# Patient Record
Sex: MALE | NOT HISPANIC OR LATINO | URBAN - NONMETROPOLITAN AREA
[De-identification: names, ages, dates, MRNs, and addresses within clinical notes are randomized per-mention and may not be internally consistent; named-entity substitution may affect disease eponyms.]

---

## 2019-02-20 ENCOUNTER — OFFICE VISIT CONVERTED (OUTPATIENT)
Dept: FAMILY MEDICINE CLINIC | Age: 29
End: 2019-02-20
Attending: FAMILY MEDICINE

## 2019-02-20 ENCOUNTER — HOSPITAL ENCOUNTER (OUTPATIENT)
Dept: OTHER | Facility: HOSPITAL | Age: 29
Discharge: HOME | End: 2019-02-20
Attending: FAMILY MEDICINE

## 2019-02-21 LAB
ALBUMIN SERPL-MCNC: 4.5 G/DL (ref 3.5–5)
ALBUMIN/GLOB SERPL: 1.8 {RATIO} (ref 1.4–2.6)
ALP SERPL-CCNC: 56 U/L (ref 53–128)
ALT SERPL-CCNC: 31 U/L (ref 10–40)
ANION GAP SERPL CALC-SCNC: 14 MMOL/L (ref 8–19)
AST SERPL-CCNC: 32 U/L (ref 15–50)
BASOPHILS # BLD MANUAL: 0.03 10*3/UL (ref 0–0.2)
BASOPHILS NFR BLD MANUAL: 0.3 % (ref 0–3)
BILIRUB SERPL-MCNC: 0.24 MG/DL (ref 0.2–1.3)
BUN SERPL-MCNC: 11 MG/DL (ref 5–25)
BUN/CREAT SERPL: 10 {RATIO} (ref 6–20)
CALCIUM SERPL-MCNC: 9 MG/DL (ref 8.7–10.4)
CHLORIDE SERPL-SCNC: 106 MMOL/L (ref 99–111)
CHOLEST SERPL-MCNC: 162 MG/DL (ref 107–200)
CHOLEST/HDLC SERPL: 3.8 {RATIO} (ref 3–6)
CONV CO2: 25 MMOL/L (ref 22–32)
CONV TOTAL PROTEIN: 7 G/DL (ref 6.3–8.2)
CREAT UR-MCNC: 1.05 MG/DL (ref 0.7–1.2)
DEPRECATED RDW RBC AUTO: 41.1 FL
EOSINOPHIL # BLD MANUAL: 0.12 10*3/UL (ref 0–0.7)
EOSINOPHIL NFR BLD MANUAL: 1.2 % (ref 0–7)
ERYTHROCYTE [DISTWIDTH] IN BLOOD BY AUTOMATED COUNT: 12.6 % (ref 11.5–14.5)
GFR SERPLBLD BASED ON 1.73 SQ M-ARVRAT: >60 ML/MIN/{1.73_M2}
GLOBULIN UR ELPH-MCNC: 2.5 G/DL (ref 2–3.5)
GLUCOSE SERPL-MCNC: 81 MG/DL (ref 70–99)
GRANS (ABSOLUTE): 6.32 10*3/UL (ref 2–8)
GRANS: 64.6 % (ref 30–85)
HBA1C MFR BLD: 15.2 G/DL (ref 14–18)
HCT VFR BLD AUTO: 44.9 % (ref 42–52)
HDLC SERPL-MCNC: 43 MG/DL (ref 40–60)
IMM GRANULOCYTES # BLD: 0.01 10*3/UL (ref 0–0.54)
IMM GRANULOCYTES NFR BLD: 0.1 % (ref 0–0.43)
LDLC SERPL CALC-MCNC: 102 MG/DL (ref 70–100)
LYMPHOCYTES # BLD MANUAL: 2.74 10*3/UL (ref 1–5)
LYMPHOCYTES NFR BLD MANUAL: 5.8 % (ref 3–10)
MCH RBC QN AUTO: 30 PG (ref 27–31)
MCHC RBC AUTO-ENTMCNC: 33.9 G/DL (ref 33–37)
MCV RBC AUTO: 88.6 FL (ref 80–96)
MONOCYTES # BLD AUTO: 0.57 10*3/UL (ref 0.2–1.2)
OSMOLALITY SERPL CALC.SUM OF ELEC: 290 MOSM/KG (ref 273–304)
PLATELET # BLD AUTO: 200 10*3/UL (ref 130–400)
PMV BLD AUTO: 11.3 FL (ref 7.4–10.4)
POTASSIUM SERPL-SCNC: 3.8 MMOL/L (ref 3.5–5.3)
RBC # BLD AUTO: 5.07 10*6/UL (ref 4.7–6.1)
SODIUM SERPL-SCNC: 141 MMOL/L (ref 135–147)
TRIGL SERPL-MCNC: 86 MG/DL (ref 40–150)
TSH SERPL-ACNC: 1.37 M[IU]/L (ref 0.27–4.2)
VARIANT LYMPHS NFR BLD MANUAL: 28 % (ref 20–45)
VLDLC SERPL-MCNC: 17 MG/DL (ref 5–37)
WBC # BLD AUTO: 9.79 10*3/UL (ref 4.8–10.8)

## 2020-02-27 ENCOUNTER — HOSPITAL ENCOUNTER (OUTPATIENT)
Dept: OTHER | Facility: HOSPITAL | Age: 30
Discharge: HOME | End: 2020-02-27
Attending: NURSE PRACTITIONER

## 2020-02-27 ENCOUNTER — OFFICE VISIT CONVERTED (OUTPATIENT)
Dept: FAMILY MEDICINE CLINIC | Age: 30
End: 2020-02-27
Attending: NURSE PRACTITIONER

## 2020-02-27 LAB
ALBUMIN SERPL-MCNC: 4.9 G/DL (ref 3.5–5)
ALBUMIN/GLOB SERPL: 2.1 {RATIO} (ref 1.4–2.6)
ALP SERPL-CCNC: 64 U/L (ref 53–128)
ALT SERPL-CCNC: 31 U/L (ref 10–40)
ANION GAP SERPL CALC-SCNC: 18 MMOL/L (ref 8–19)
AST SERPL-CCNC: 25 U/L (ref 15–50)
BILIRUB SERPL-MCNC: 0.36 MG/DL (ref 0.2–1.3)
BUN SERPL-MCNC: 9 MG/DL (ref 5–25)
BUN/CREAT SERPL: 10 {RATIO} (ref 6–20)
CALCIUM SERPL-MCNC: 9.1 MG/DL (ref 8.7–10.4)
CHLORIDE SERPL-SCNC: 104 MMOL/L (ref 99–111)
CHOLEST SERPL-MCNC: 158 MG/DL (ref 107–200)
CHOLEST/HDLC SERPL: 4.9 {RATIO} (ref 3–6)
CONV CO2: 23 MMOL/L (ref 22–32)
CONV TOTAL PROTEIN: 7.2 G/DL (ref 6.3–8.2)
CREAT UR-MCNC: 0.93 MG/DL (ref 0.7–1.2)
GFR SERPLBLD BASED ON 1.73 SQ M-ARVRAT: >60 ML/MIN/{1.73_M2}
GLOBULIN UR ELPH-MCNC: 2.3 G/DL (ref 2–3.5)
GLUCOSE SERPL-MCNC: 89 MG/DL (ref 70–99)
HDLC SERPL-MCNC: 32 MG/DL (ref 40–60)
LDLC SERPL CALC-MCNC: 98 MG/DL (ref 70–100)
OSMOLALITY SERPL CALC.SUM OF ELEC: 290 MOSM/KG (ref 273–304)
POTASSIUM SERPL-SCNC: 4.2 MMOL/L (ref 3.5–5.3)
SODIUM SERPL-SCNC: 141 MMOL/L (ref 135–147)
TRIGL SERPL-MCNC: 139 MG/DL (ref 40–150)
TSH SERPL-ACNC: 1.85 M[IU]/L (ref 0.27–4.2)
VLDLC SERPL-MCNC: 28 MG/DL (ref 5–37)

## 2020-06-15 ENCOUNTER — OFFICE VISIT CONVERTED (OUTPATIENT)
Dept: FAMILY MEDICINE CLINIC | Age: 30
End: 2020-06-15
Attending: FAMILY MEDICINE

## 2020-06-15 ENCOUNTER — HOSPITAL ENCOUNTER (OUTPATIENT)
Dept: OTHER | Facility: HOSPITAL | Age: 30
Discharge: HOME | End: 2020-06-15
Attending: FAMILY MEDICINE

## 2020-06-15 LAB
ALBUMIN SERPL-MCNC: 4.5 G/DL (ref 3.5–5)
ALBUMIN/GLOB SERPL: 1.5 {RATIO} (ref 1.4–2.6)
ALP SERPL-CCNC: 79 U/L (ref 53–128)
ALT SERPL-CCNC: 39 U/L (ref 10–40)
ANION GAP SERPL CALC-SCNC: 16 MMOL/L (ref 8–19)
APPEARANCE UR: CLEAR
AST SERPL-CCNC: 32 U/L (ref 15–50)
BACTERIA UR QL AUTO: ABNORMAL
BASOPHILS # BLD MANUAL: 0.02 10*3/UL (ref 0–0.2)
BASOPHILS NFR BLD MANUAL: 0.3 % (ref 0–3)
BILIRUB SERPL-MCNC: 0.77 MG/DL (ref 0.2–1.3)
BILIRUB UR QL: NEGATIVE
BUN SERPL-MCNC: 10 MG/DL (ref 5–25)
BUN/CREAT SERPL: 10 {RATIO} (ref 6–20)
CALCIUM SERPL-MCNC: 8.7 MG/DL (ref 8.7–10.4)
CASTS URNS QL MICRO: ABNORMAL /[LPF]
CHLORIDE SERPL-SCNC: 97 MMOL/L (ref 99–111)
CK SERPL-CCNC: 146 U/L (ref 57–374)
COLOR UR: ABNORMAL
CONV CO2: 25 MMOL/L (ref 22–32)
CONV LEUKOCYTE ESTERASE: NEGATIVE
CONV TOTAL PROTEIN: 7.6 G/DL (ref 6.3–8.2)
CONV UROBILINOGEN IN URINE BY AUTOMATED TEST STRIP: >=8 {EHRLICHU}/DL (ref 0.1–1)
CREAT UR-MCNC: 1.04 MG/DL (ref 0.7–1.2)
DEPRECATED RDW RBC AUTO: 41.1 FL
EOSINOPHIL # BLD MANUAL: 0.06 10*3/UL (ref 0–0.7)
EOSINOPHIL NFR BLD MANUAL: 0.8 % (ref 0–7)
EPI CELLS #/AREA URNS HPF: ABNORMAL /[HPF]
ERYTHROCYTE [DISTWIDTH] IN BLOOD BY AUTOMATED COUNT: 12.4 % (ref 11.5–14.5)
GFR SERPLBLD BASED ON 1.73 SQ M-ARVRAT: >60 ML/MIN/{1.73_M2}
GLOBULIN UR ELPH-MCNC: 3.1 G/DL (ref 2–3.5)
GLUCOSE 24H UR-MCNC: NEGATIVE MG/DL
GLUCOSE SERPL-MCNC: 105 MG/DL (ref 70–99)
GRANS (ABSOLUTE): 5.18 10*3/UL (ref 2–8)
GRANS: 68.9 % (ref 30–85)
HBA1C MFR BLD: 15.7 G/DL (ref 14–18)
HCT VFR BLD AUTO: 46.1 % (ref 42–52)
HGB UR QL STRIP: ABNORMAL
IMM GRANULOCYTES # BLD: 0.01 10*3/UL (ref 0–0.54)
IMM GRANULOCYTES NFR BLD: 0.1 % (ref 0–0.43)
KETONES UR QL STRIP: ABNORMAL MG/DL
LYMPHOCYTES # BLD MANUAL: 1.56 10*3/UL (ref 1–5)
LYMPHOCYTES NFR BLD MANUAL: 9.1 % (ref 3–10)
MCH RBC QN AUTO: 30.1 PG (ref 27–31)
MCHC RBC AUTO-ENTMCNC: 34.1 G/DL (ref 33–37)
MCV RBC AUTO: 88.5 FL (ref 80–96)
MONOCYTES # BLD AUTO: 0.68 10*3/UL (ref 0.2–1.2)
MUCOUS THREADS URNS QL MICRO: ABNORMAL
NITRITE UR-MCNC: NEGATIVE MG/ML
OSMOLALITY SERPL CALC.SUM OF ELEC: 277 MOSM/KG (ref 273–304)
PH UR STRIP.AUTO: 6 [PH] (ref 5–8)
PLATELET # BLD AUTO: 152 10*3/UL (ref 130–400)
PMV BLD AUTO: 11.2 FL (ref 7.4–10.4)
POTASSIUM SERPL-SCNC: 4.3 MMOL/L (ref 3.5–5.3)
PROT UR-MCNC: NEGATIVE MG/DL
RBC # BLD AUTO: 5.21 10*6/UL (ref 4.7–6.1)
RBC # BLD AUTO: ABNORMAL /[HPF]
SODIUM SERPL-SCNC: 134 MMOL/L (ref 135–147)
SP GR UR STRIP: <=1.005 (ref 1–1.03)
SPECIMEN SOURCE: ABNORMAL
TSH SERPL-ACNC: 1.56 M[IU]/L (ref 0.27–4.2)
UNIDENT CRYS URNS QL MICRO: ABNORMAL /[HPF]
VARIANT LYMPHS NFR BLD MANUAL: 20.8 % (ref 20–45)
WBC # BLD AUTO: 7.51 10*3/UL (ref 4.8–10.8)
WBC #/AREA URNS HPF: ABNORMAL /[HPF]

## 2021-05-18 NOTE — PROGRESS NOTES
Jenny Bernardo MORRO  1990     Office/Outpatient Visit    Visit Date: Mon, Humphrey 15, 2020 10:08 am    Provider: Matthew Reina MD (Assistant: Brittanie Almeida MA)    Location: [facility]        Electronically signed by Matthew Reina MD on  06/15/2020 08:56:46 PM                             Subjective:        CC: LUCIANO is a 30 year old White male.  presents today due to blisters on roof of mouth         HPI:       Pt has painful blisters on the roof of his mouth, left lymph node in neck is swollen, burping a lot, and urine is very dark. He has pain in left lower quadrant and left lower back. Sx started 2 days ago, although blisters in his mouth started this morning. His urine is dark, apple juice colored, normally its clear. Back pain can be 8 and seems to come in waves. Sharp hit that fades out.      He is urinating often, no BM for 6 days although is passing gas without difficulty. He is burping a lot. He typically has a BM 2-3x/day and its typically soft, brown, formed, not constipation or diarrhea. No blood or melena. He has been eating less due to mouth pain although appetite is the same. He had rhabdomylysis 4 year ago accompanied by muscle weakness but he cannot remember this at all.     ROS:     CONSTITUTIONAL:  Positive for fatigue.   Negative for fever.      EYES:  Negative for blurred vision.      E/N/T:  Positive for painful blisters in mouth.   Negative for diminished hearing or nasal congestion.      CARDIOVASCULAR:  Negative for chest pain and palpitations.      RESPIRATORY:  Negative for recent cough and dyspnea.      GASTROINTESTINAL:  Positive for abdominal pain ( LLQ ) and constipation.   Negative for diarrhea, nausea or vomiting.      GENITOURINARY:  Positive for dark urine.   Negative for dysuria.      MUSCULOSKELETAL:  Positive for back pain ( acute; left lower ) and myalgias (upper back, shoulders and arm on Saturday and resolved Sunday).   Negative for arthralgias.       NEUROLOGICAL:  Negative for paresthesias and weakness.      HEMATOLOGIC/LYMPHATIC:  Positive for lymphadenopathy ( cervical; left ).      PSYCHIATRIC:  Negative for anxiety, depression and sleep disturbance.          Past Medical History / Family History / Social History:         Last Reviewed on 6/15/2020 08:55 PM by Matthew Reina    Past Medical History:         Asthma: as child;         Surgical History:     NONE         Family History:     Unremarkable Father: Hypertension;  Skin Cancer     Mother: Skin Cancer         Social History:     Occupation: a [employer]     Marital Status: Single     Children: 2 children     Exercise: Primary form of exercise is work, work, work.          Tobacco/Alcohol/Supplements:     Last Reviewed on 6/15/2020 08:55 PM by Matthew Reina    Tobacco: Current Smoker: He currently smokes every day, 1-5 cigarettes per day.          Alcohol: Frequency: Once a month     Caffeine:  He admits to consuming caffeine via soda ( 1 serving per day ).          Substance Abuse History:     Last Reviewed on 6/15/2020 08:55 PM by Matthew Reina    NEGATIVE         Mental Health History:     Last Reviewed on 6/15/2020 08:55 PM by Matthew Reina        Communicable Diseases (eg STDs):     Last Reviewed on 6/15/2020 08:55 PM by Matthew Reina        Current Problems:     Last Reviewed on 6/15/2020 08:55 PM by Matthew Reina    Atrophy of thyroid (acquired)    Encounter for general adult medical examination without abnormal findings    Encounter for screening for depression    Low back pain    Left lower quadrant pain        Immunizations:     None        Allergies:     Last Reviewed on 6/15/2020 08:55 PM by Matthew Reina    Percocet:          Current Medications:     Last Reviewed on 6/15/2020 08:55 PM by Matthew Reina    No Known Medications.        Objective:        Vitals:         Current: 6/15/2020 10:18:16 AM    Ht:  5 ft, 8 in;  Wt: 143.6 lbs;  BMI: 21.8T: 97.3 F (oral);  BP:  127/78 mm Hg (right arm, sitting);  P: 94 bpm (right arm (BP Cuff), sitting);  sCr: 0.93 mg/dL;  GFR: 98.43        Exams:     PHYSICAL EXAM:     GENERAL: Vitals recorded well developed, well nourished;  well groomed;  no apparent distress;     EYES: extraocular movements intact; conjunctiva and cornea are normal; PERRLA;     E/N/T: OROPHARYNX:  normal mucosa, dentition, gingiva, and posterior pharynx;     RESPIRATORY: normal respiratory rate and pattern with no distress; diffuse expiratory wheezes;     CARDIOVASCULAR: normal rate; rhythm is regular;  no systolic murmur; no edema;     GASTROINTESTINAL: mild LLQ tenderness;  normal bowel sounds;     LYMPHATIC: left submandibular node;     MUSCULOSKELETAL: normal gait; normal overall tone     NEUROLOGIC: mental status: alert and oriented x 3;     PSYCHIATRIC:  appropriate affect and demeanor; normal speech pattern; grossly normal memory;         Assessment:         M54.5   Low back pain       R10.32   Left lower quadrant pain           ORDERS:         Meds Prescribed:       [New Rx] senna 8.6 mg oral tablet [take 1 tablets by oral route once daily], #30 (thirty) tablets, Refills: 2 (two)       [New Rx] Miralax 17 gram oral Powder in Packet [take 1 packet (17 gram) mixed with 8 oz. water by oral route once daily], #24 (twenty four) packets, Refills: 3 (three)         Lab Orders:       [id number]  [facility] CBC with 3 part diff  (Send-Out)            [id number]  CK - [facility]- CK total  (Send-Out)            44776  COMP - [facility] Comp. Metabolic Panel  (Send-Out)            28302  TSH - [facility] TSH  (Send-Out)            94243  [facility] Urinalysis, automated, with micro  (Send-Out)              Other Orders:       [id number]  CT Abdomen and Pelvis with IV Contrast; prefer oral prep  (Send-Out; Stat)                      Plan:         Low back painAs below, DDx includes kidney stones, MSK, or simple constipation.     LABORATORY:  Labs ordered to be performed today include urinalysis with micro.             Orders:       [id number]  [facility] Urinalysis, automated, with micro  (Send-Out)              Left lower quadrant painDDx includes simple constipation causing LLQ ab pain and left low back pain as well as burping and dark urine from dehydration. Kidney stone is also possible/likely. His h/o rhabdomylysis is concerning and so repeat CK is ordered and CT Ab/pelvis to assess for occult malignancy, constipation, and while contrast might obscure a stone it will detect hydronephrosis.     LABORATORY:  Labs ordered to be performed today include CBC, CK, total, Comprehensive metabolic panel, and TSH.      RADIOLOGY:  I have ordered Test to be ordered CT of CT abdomen and pelvis with contrast; oral prep to be done today.            Prescriptions:       [New Rx] senna 8.6 mg oral tablet [take 1 tablets by oral route once daily], #30 (thirty) tablets, Refills: 2 (two)       [New Rx] Miralax 17 gram oral Powder in Packet [take 1 packet (17 gram) mixed with 8 oz. water by oral route once daily], #24 (twenty four) packets, Refills: 3 (three)           Orders:       [id number]  [facility] CBC with 3 part diff  (Send-Out)            18072  CK - [facility]- CK total  (Send-Out)            79141  COMP - [facility] Comp. Metabolic Panel  (Send-Out)            45478  TSH - [facility] TSH  (Send-Out)            62148  CT Abdomen and Pelvis with IV Contrast; prefer oral prep  (Send-Out; Stat)                  Charge Capture:         Primary Diagnosis:     M54.5  Low back pain           Orders:      [id number]  Office/outpatient visit; established patient, level 4  (In-House)              R10.32  Left lower quadrant pain

## 2021-05-18 NOTE — PROGRESS NOTES
JennyEliza sifuentesett CYNTHIA 1990     Office/Outpatient Visit    Visit Date: Wed, Feb 20, 2019 03:38 pm    Provider: Matthew Reina MD (Assistant: Cassidy Yeh LPN)    Location: [facility]        Electronically signed by Matthew Reina MD on  02/20/2019 05:43:03 PM                             SUBJECTIVE:        CC:     LUCIANO is a 29 year old White male.  Preventative Exam         HPI:     Pt reports he's doing well, denies any health complaints aside from fatigue.     Health checkup noted.          PHQ-9 Depression Screening: Completed form scanned and in chart; Total Score 6 Alcohol Consumption Screening: Completed form scanned and in chart; Total Score 3     Pt reports moderate intemittent fatigue and sleepiness.     ROS:     CONSTITUTIONAL:  Positive for fatigue.   Negative for fever.      EYES:  Negative for blurred vision.      E/N/T:  Negative for diminished hearing and nasal congestion.      CARDIOVASCULAR:  Negative for chest pain and palpitations.      RESPIRATORY:  Negative for recent cough and dyspnea.      GASTROINTESTINAL:  Negative for abdominal pain, constipation, diarrhea, nausea and vomiting.      MUSCULOSKELETAL:  Negative for arthralgias and myalgias.      NEUROLOGICAL:  Negative for paresthesias and weakness.      PSYCHIATRIC:  Negative for anxiety, depression and sleep disturbance.          PMH/FMH/SH:     Last Reviewed on 2/20/2019 03:59 PM by Matthew Reina    Past Medical History:         Asthma: as child;         Surgical History:     NONE         Family History:     Unremarkable Father: Skin Cancer     Mother: Skin Cancer         Social History:     Occupation: a [employer]     Marital Status: Single     Children: 2 children     Hobbies/Recreation: he enjoys drag race;     Exercise: Primary form of exercise is work, work, work.          Tobacco/Alcohol/Supplements:     Last Reviewed on 2/20/2019 03:59 PM by Matthew Reina    Tobacco: Current Smoker: He currently smokes every  day, 1-5 cigarettes per day.          Alcohol: Frequency: Once a month     Caffeine:  He admits to consuming caffeine via soda ( 1 serving per day ).          Substance Abuse History:     Last Reviewed on 2/20/2019 03:59 PM by Matthew Reina    NEGATIVE         Mental Health History:     Last Reviewed on 2/20/2019 03:59 PM by Matthew Reina        Communicable Diseases (eg STDs):     Last Reviewed on 2/20/2019 03:59 PM by Matthew Reina            Current Problems:     Last Reviewed on 2/20/2019 03:59 PM by Matthew Reina    Generalized weakness     Screening for depression     Other abnormal serum enzyme levels         Immunizations:     None        Allergies:     Last Reviewed on 2/20/2019 03:59 PM by Matthew Reina    Percocet:        Current Medications:     Last Reviewed on 2/20/2019 03:59 PM by Matthew Renia      No Known Medications.         OBJECTIVE:        Vitals:         Current: 2/20/2019 3:45:26 PM    Ht:  5 ft, 8 in;  Wt: 145 lbs;  WC: 32 inches;  BMI: 22.0    T: 98.5 F (oral);  BP: 108/66 mm Hg (left arm, sitting);  P: 83 bpm (left arm (BP Cuff), sitting);  sCr: 0.97 mg/dL;  GFR: 95.62        Exams:     PHYSICAL EXAM:     GENERAL: Vitals recorded well developed, well nourished;  well groomed;  no apparent distress;     EYES: extraocular movements intact; conjunctiva and cornea are normal; PERRLA;     E/N/T: OROPHARYNX:  normal mucosa, dentition, gingiva, and posterior pharynx;     RESPIRATORY: normal respiratory rate and pattern with no distress; normal breath sounds with no rales, rhonchi, wheezes or rubs;     CARDIOVASCULAR: normal rate; rhythm is regular;  no systolic murmur; no edema;     GASTROINTESTINAL: nontender; normal bowel sounds;     SKIN: small suprapubic warts scattered in pubic region;     MUSCULOSKELETAL: normal gait; normal overall tone     NEUROLOGIC: mental status: alert and oriented x 3;     PSYCHIATRIC:  appropriate affect and demeanor; normal speech pattern; grossly  normal memory;         ASSESSMENT           V70.0   Z00.00  Health checkup              DDx:     V79.0   Z13.89  Screening for depression              DDx:     780.79   R53.83  Fatigue              DDx:     V81.2   Z13.6  Screening for cardiovascular conditions              DDx:         ORDERS:         Lab Orders:       [id number]  [facility] PHYSICAL: CMP, CBC, TSH, LIPID: [id number], [id number], [id number], [id number]  (Send-Out)           Other Orders:         Depression screen negative  (In-House)         1101F  Pt screen for fall risk; document no falls in past year or only 1 fall w/o injury in past year (SANDRA)  (In-House)           Negative EtOH screen  (In-House)                   PLAN:          Health checkup Pt was advised to try OTC Compound W for genital warts but that I would call in something prescription strength if needed.          Screening for depression     MIPS PHQ-9 Depression Screening Completed form scanned and in chart; Total Score 6 Negative alcohol screen           Orders:         Depression screen negative  (In-House)         1101F  Pt screen for fall risk; document no falls in past year or only 1 fall w/o injury in past year (SANDRA)  (In-House)           Negative EtOH screen  (In-House)            Fatigue     LABORATORY:  Labs ordered to be performed today include PHYSICAL PANEL; CMP, CBC, TSH, LIPID.            Orders:       [id number]  [facility] PHYSICAL: CMP, CBC, TSH, LIPID: [id number], [id number], [id number], [id number]  (Send-Out)               CHARGE CAPTURE           **Please note: ICD descriptions below are intended for billing purposes only and may not represent clinical diagnoses**        Primary Diagnosis:         V70.0 Health checkup            Z00.00    Encounter for general adult medical examination without abnormal findings              Orders:          [id number]   Preventive medicine, established patient, age 18-39 years  (In-House)           V79.0 Screening for depression            Z13.89    Encounter  for screening for other disorder              Orders:             Depression screen negative  (In-House)             1101F   Pt screen for fall risk; document no falls in past year or only 1 fall w/o injury in past year (SANDRA)  (In-House)                Negative EtOH screen  (In-House)           780.79 Fatigue            R53.83    Other fatigue    V81.2 Screening for cardiovascular conditions            Z13.6    Encounter for screening for cardiovascular disorders        ADDENDUMS:      ____________________________________    Addendum: 04/05/2019 08:37 ELIER - Matthew Reina        Labs were for preventative care Z00.00 Z13.6

## 2021-05-18 NOTE — PROGRESS NOTES
Eliza Alvaradoett MORRO  1990     Office/Outpatient Visit    Visit Date: Thu, Feb 27, 2020 11:40 am    Provider: Shayy Kramer N.P. (Assistant: Nazia Zamora MA)    Location: [facility]        Electronically signed by Shayy Kramer N.P. on  02/27/2020 12:29:50 PM                             Subjective:        CC: LUCIANO is a 30 year old White male.  Patient presents today for physical exam         HPI: 30 year old male presenting to clinic for physical exam. Pt works out [employer]. Medical hx is unremarkable. No sx or routine medication. Family hx discussed. No allergies. Pt declines flu vaccine at this time. Pt states he is chronically fatigued d/t working so much overtime. No change since last visit last year. He also is needing full upper dentures, but even with insurance, it will be $600 out of pocket. Patient can not afford at this time. He does not go to optometrist regularly and does not have any problems with vision. Pt smokes 1 - 5 cigg daily. Rarely drinks. Denies illicit drug use.           Encounter for general adult medical examination without abnormal findings noted.            PHQ-9 Depression Screening: Completed form scanned and in chart; Total Score 5     ROS:     CONSTITUTIONAL:  Negative for chills, fatigue and fever.      EYES:  Negative for blurred vision.      E/N/T:  Negative for diminished hearing and nasal congestion.      CARDIOVASCULAR:  Negative for chest pain, dizziness, palpitations and pedal edema.      RESPIRATORY:  Negative for recent cough and dyspnea.      GASTROINTESTINAL:  Negative for abdominal pain, constipation, diarrhea, nausea and vomiting.      GENITOURINARY:  Negative for dysuria.      MUSCULOSKELETAL:  Negative for arthralgias and myalgias.      INTEGUMENTARY:  Negative for atypical mole(s) and rash.      NEUROLOGICAL:  Negative for headaches, paresthesias and weakness.      PSYCHIATRIC:  Negative for anxiety, depression and sleep disturbance.           Past Medical History / Family History / Social History:         Last Reviewed on 2/27/2020 12:11 PM by Shayy Kramer    Past Medical History:         Asthma: as child;         Surgical History:     NONE         Family History:     Unremarkable Father: Hypertension;  Skin Cancer     Mother: Skin Cancer         Social History:     Occupation: a [employer]     Marital Status: Single     Children: 2 children     Exercise: Primary form of exercise is work, work, work.          Tobacco/Alcohol/Supplements:     Last Reviewed on 2/27/2020 12:11 PM by Shayy Kramer    Tobacco: Current Smoker: He currently smokes every day, 1-5 cigarettes per day.          Alcohol: Frequency: Once a month     Caffeine:  He admits to consuming caffeine via soda ( 1 serving per day ).          Substance Abuse History:     Last Reviewed on 2/27/2020 12:12 PM by Shayy Kramer    NEGATIVE         Mental Health History:     Last Reviewed on 2/27/2020 12:12 PM by Shayy Kramer        Communicable Diseases (eg STDs):     Last Reviewed on 2/20/2019 03:59 PM by Matthew Reina        Immunizations:     None        Allergies:     Last Reviewed on 2/27/2020 12:10 PM by Shayy Kramer    Percocet:          Current Medications:     Last Reviewed on 2/27/2020 12:10 PM by Shayy Kramer    No Known Medications.        Objective:        Vitals:         Current: 2/27/2020 11:44:03 AM    Ht:  5 ft, 8 in;  Wt: 152.8 lbs;  BMI: 23.2T: 98.3 F (oral);  BP: 111/61 mm Hg (left arm, sitting);  P: 85 bpm (left arm (BP Cuff), sitting);  sCr: 1.05 mg/dL;  GFR: 89.52        Exams:     PHYSICAL EXAM:     GENERAL: Vitals recorded well developed, well nourished;  well groomed;  no apparent distress;     EYES: extraocular movements intact; conjunctiva and cornea are normal; PERRLA;     E/N/T: EARS:  normal external auditory canals and tympanic membranes;  grossly normal hearing; NOSE:  normal nasal mucosa, septum, turbinates, and sinuses;  OROPHARYNX: poor dentition;     NECK: range of motion is normal; trachea is midline; thyroid exam reveals diffuse enlargement and not enlarged;     RESPIRATORY: normal respiratory rate and pattern with no distress; normal breath sounds with no rales, rhonchi, wheezes or rubs;     CARDIOVASCULAR: normal rate; rhythm is regular;  no systolic murmur; no edema;     GASTROINTESTINAL: nontender; normal bowel sounds; no masses; no abdominal hernias;     LYMPHATIC: no enlargement of cervical or facial nodes; no supraclavicular nodes;     SKIN:  no rash; no jaundice, good turgor;     MUSCULOSKELETAL: normal gait; normal overall tone     NEUROLOGIC: mental status: alert and oriented x 3; reflexes: knee jerks: 2+;     PSYCHIATRIC:  appropriate affect and demeanor; normal speech pattern; grossly normal memory;         Assessment:         Z00.00   Encounter for general adult medical examination without abnormal findings       E03.4   Atrophy of thyroid (acquired)       Z13.31   Encounter for screening for depression           ORDERS:         Lab Orders:       [id number]  COMP [facility] Comp. Metabolic Panel  (Send-Out)            08754  LPDP - [facility] Lipid Panel  (Send-Out)            81392  TSH - [facility] TSH  (Send-Out)              Other Orders:         Depression screen negative  (In-House)            1124F  Advance Care Planning discussed and doc in MR; no surrogate named or advance care plan provided  (Send-Out)                      Plan:         Encounter for general adult medical examination without abnormal findingsPt is fasting today. Will fill out paper work for insurance and fax. Pt will be notified of results. Pt to return to clinic as needed. Repeat physical in 1 year. Pt to f/u with dentisit as soon as possible. Pt v/u and had no further questions upon d/c.    LABORATORY:  Labs ordered to be performed today include Comprehensive metabolic panel, lipid panel, and TSH.            Orders:       [id number]  COMP - [facility] Comp. Metabolic  Panel  (Send-Out)            93522  LPDP - [facility] Lipid Panel  (Send-Out)            31147  TSH - [facility] TSH  (Send-Out)              Atrophy of thyroid (acquired)minimally enlarged. labs being checked today. will notify pt of results.         Encounter for screening for depression    MIPS PHQ-9 Depression Screening: Completed form scanned and in chart; Total Score 5; Positive Depression Screen but after further evaluation the patient does not have a diagnosis of depression.            Orders:         Depression screen negative  (In-House)            1124F  Advance Care Planning discussed and doc in MR; no surrogate named or advance care plan provided  (Send-Out)                  Charge Capture:         Primary Diagnosis:     Z00.00  Encounter for general adult medical examination without abnormal findings           Orders:      96092  Preventive medicine, established patient, age 18-39 years  (In-House)              E03.4  Atrophy of thyroid (acquired)     Z13.31  Encounter for screening for depression           Orders:        Depression screen negative  (In-House)

## 2021-07-01 VITALS
HEART RATE: 83 BPM | DIASTOLIC BLOOD PRESSURE: 66 MMHG | HEIGHT: 68 IN | TEMPERATURE: 98.5 F | WEIGHT: 145 LBS | SYSTOLIC BLOOD PRESSURE: 108 MMHG | BODY MASS INDEX: 21.98 KG/M2

## 2021-07-02 VITALS
TEMPERATURE: 98.3 F | DIASTOLIC BLOOD PRESSURE: 61 MMHG | HEART RATE: 85 BPM | BODY MASS INDEX: 23.16 KG/M2 | WEIGHT: 152.8 LBS | SYSTOLIC BLOOD PRESSURE: 111 MMHG | HEIGHT: 68 IN

## 2021-07-02 VITALS
TEMPERATURE: 97.3 F | BODY MASS INDEX: 21.76 KG/M2 | DIASTOLIC BLOOD PRESSURE: 78 MMHG | WEIGHT: 143.6 LBS | HEART RATE: 94 BPM | HEIGHT: 68 IN | SYSTOLIC BLOOD PRESSURE: 127 MMHG